# Patient Record
Sex: MALE | Race: BLACK OR AFRICAN AMERICAN | NOT HISPANIC OR LATINO | Employment: FULL TIME | URBAN - METROPOLITAN AREA
[De-identification: names, ages, dates, MRNs, and addresses within clinical notes are randomized per-mention and may not be internally consistent; named-entity substitution may affect disease eponyms.]

---

## 2017-10-22 ENCOUNTER — HOSPITAL ENCOUNTER (EMERGENCY)
Facility: HOSPITAL | Age: 32
Discharge: HOME | End: 2017-10-22
Attending: EMERGENCY MEDICINE
Payer: MEDICAID

## 2017-10-22 VITALS
HEIGHT: 68 IN | RESPIRATION RATE: 16 BRPM | SYSTOLIC BLOOD PRESSURE: 140 MMHG | DIASTOLIC BLOOD PRESSURE: 86 MMHG | OXYGEN SATURATION: 96 % | HEART RATE: 88 BPM | BODY MASS INDEX: 22.73 KG/M2 | TEMPERATURE: 98 F | WEIGHT: 150 LBS

## 2017-10-22 DIAGNOSIS — B35.4 TINEA CORPORIS: Primary | ICD-10-CM

## 2017-10-22 PROCEDURE — 99283 EMERGENCY DEPT VISIT LOW MDM: CPT

## 2017-10-22 RX ORDER — CLOTRIMAZOLE 1 %
CREAM (GRAM) TOPICAL
Qty: 28 G | Refills: 0 | OUTPATIENT
Start: 2017-10-22 | End: 2018-09-20

## 2017-10-22 NOTE — DISCHARGE INSTRUCTIONS
Please return to the Emergency Department for any new or worsening symptoms including: worsening rash or spreading of the rash, drainage, fever, chest pain, shortness of breath, loss of consciousness, dizziness, weakness, or any other concerns.     Please follow up with your Primary Care Provider or Dermatology within in the week. If you do not have one, you may contact the one listed on your discharge paperwork or you may also call the [facility] Appointment Desk at [phone] to schedule an appointment with one.     Please take all medication as prescribed.  Use the antifungal cream twice a day.  Please cover the lesion and extend approximately 1 inch around the edges.  Use this until the rash completely resolves and then 1 week additional week.  Benadryl as directed as needed for itching.

## 2017-10-22 NOTE — ED PROVIDER NOTES
"Encounter Date: 10/22/2017    SCRIBE #1 NOTE: I, Humphrey Sam , am scribing for, and in the presence of,  AYANA Pond . I have scribed the following portions of the note - Other sections scribed: HPI/ROS .       History     Chief Complaint   Patient presents with    Rash     " I think I may have gotten bit by something on my back at work. I noticed bumps on my back 2 days ago. +itching      CC: Rash     HPI: This 32 y.o. male with a hx of chronic back pain presents to the ED c/o three pruritic lesions to the upper back which initially began as one small, raised, and hard lesion 1 week ago which has since spread and increased in size. Pt notes he has been frequently scratching the area due to the intense itching. No prior attempted tx. No hx of similar rash. Pt states his son, who recently visited him, has a similar lesion to his face and is unsure if he was being treated for it.  Pt otherwise denies fever, pain, or any other associated symptoms.         The history is provided by the patient. No  was used.     Review of patient's allergies indicates:  No Known Allergies  Past Medical History:   Diagnosis Date    Back pain     x 10 years     History reviewed. No pertinent surgical history.  History reviewed. No pertinent family history.  Social History   Substance Use Topics    Smoking status: Former Smoker    Smokeless tobacco: Never Used    Alcohol use Yes     Review of Systems   Constitutional: Negative for chills, fatigue and fever.   HENT: Negative for congestion, ear pain, rhinorrhea and sore throat.    Respiratory: Negative for cough and shortness of breath.    Musculoskeletal: Negative for back pain, neck pain and neck stiffness.   Skin: Positive for rash (pruritic lesions to the upper back ).        (-) Drainage from the Wound   Psychiatric/Behavioral: Negative for confusion.       Physical Exam     Initial Vitals [10/22/17 1736]   BP Pulse Resp Temp SpO2   (!) 140/86 88 " 16 98.2 °F (36.8 °C) 96 %      MAP       104         Physical Exam    Nursing note and vitals reviewed.  Constitutional: He appears well-developed and well-nourished. He is not diaphoretic. He is cooperative.  Non-toxic appearance. No distress.   HENT:   Head: Normocephalic and atraumatic.   Right Ear: External ear normal.   Left Ear: External ear normal.   Mouth/Throat: Oropharynx is clear and moist.   Eyes: Conjunctivae and EOM are normal.   Neck: Normal range of motion.   Cardiovascular: Normal rate and regular rhythm.   Pulmonary/Chest: No respiratory distress.   Musculoskeletal: Normal range of motion.   Neurological: He is alert and oriented to person, place, and time. GCS eye subscore is 4. GCS verbal subscore is 5. GCS motor subscore is 6.   Skin: Skin is warm and dry. Capillary refill takes less than 2 seconds. Lesion (to back - see below) noted. No rash noted.   3 lesions noted to the mid back.  The lesions have some mild induration.  The central lesion does have an abrasion/skin avulsion present.  The patient is using a back stretcher, do believe this is likely the cause of change in appearance of lesions.   Psychiatric: He has a normal mood and affect. His behavior is normal. Judgment and thought content normal.             ED Course   Procedures  Labs Reviewed - No data to display                APC / Resident Notes:   This is an evaluation of a 32 y.o. male that presents to the Emergency Department for rash to the back. The patient's significant other reports that it started off as a small central annular lesion and progressed outwardly in a [place] and then spread to the two separate lesions.  The patient is a non-toxic, afebrile, and well appearing male. On physical exam, there is a annular and raised border rash with central clearing mild induration.  The central lesion has a skin tear/avulsion near the lower portion.  The patient was using a back scratcher for itching.  I do believe this is related  and the cause of the skin avulsion and induration.  There is no surrounding erythema suggestive underlying abscess. There are no oral mucosa lesions, lesions in the webspace's of the fingers or toes, lesions on the palms or soles, vesicular lesions, desquamation, or sloughing of the skin. No herald patch or satellite lesions. It does appear fungal. Vital Signs are Reassuring.     Given the above findings, my overall impression is Tinea/ringworm. Given the above findings, I do not think the patients rash is a result of Hand, Foot, and Mouth, Scabies, Shingles, Chicken Pox, meningococcemia, TENs, or SJS.     DC Meds: Clotrimazole. Additional recommendations: Antifungal directions. The diagnosis, treatment plan, instructions for follow-up and reevaluation with PCP/Derm as well as ED return precautions have been discussed and an understanding has been verbalized. All questions or concerns from the patient have been addressed. This case was discussed with Dr. Waggoner who is in agreement with my assessment and plan. .         Scribe Attestation:   Scribe #1: I performed the above scribed service and the documentation accurately describes the services I performed. I attest to the accuracy of the note.    Attending Attestation:           Physician Attestation for Scribe:  Physician Attestation Statement for Scribe #1: I, AYANA Pond , reviewed documentation, as scribed by Humphrey Sam  in my presence, and it is both accurate and complete.                 ED Course      Clinical Impression:   The encounter diagnosis was Tinea corporis.    Disposition:   Disposition: Discharged  Condition: Stable                        AYANA Pond  10/22/17 0681

## 2018-09-20 ENCOUNTER — HOSPITAL ENCOUNTER (EMERGENCY)
Facility: HOSPITAL | Age: 33
Discharge: HOME | End: 2018-09-20
Attending: EMERGENCY MEDICINE

## 2018-09-20 VITALS
HEART RATE: 80 BPM | SYSTOLIC BLOOD PRESSURE: 175 MMHG | OXYGEN SATURATION: 97 % | BODY MASS INDEX: 24.25 KG/M2 | HEIGHT: 68 IN | RESPIRATION RATE: 19 BRPM | DIASTOLIC BLOOD PRESSURE: 108 MMHG | WEIGHT: 160 LBS | TEMPERATURE: 101 F

## 2018-09-20 DIAGNOSIS — R50.9 FEBRILE ILLNESS: Primary | ICD-10-CM

## 2018-09-20 LAB
BACTERIA #/AREA URNS HPF: NORMAL /HPF
BASOPHILS # BLD AUTO: 0.02 K/UL
BASOPHILS NFR BLD: 0.2 %
BILIRUB UR QL STRIP: NEGATIVE
CLARITY UR: CLEAR
COLOR UR: YELLOW
DIFFERENTIAL METHOD: ABNORMAL
EOSINOPHIL # BLD AUTO: 0 K/UL
EOSINOPHIL NFR BLD: 0 %
ERYTHROCYTE [DISTWIDTH] IN BLOOD BY AUTOMATED COUNT: 12.5 %
GLUCOSE UR QL STRIP: NEGATIVE
HCT VFR BLD AUTO: 42.3 %
HGB BLD-MCNC: 14.8 G/DL
HGB UR QL STRIP: NEGATIVE
HYALINE CASTS #/AREA URNS LPF: 0 /LPF
KETONES UR QL STRIP: ABNORMAL
LEUKOCYTE ESTERASE UR QL STRIP: NEGATIVE
LYMPHOCYTES # BLD AUTO: 0.9 K/UL
LYMPHOCYTES NFR BLD: 10.8 %
MCH RBC QN AUTO: 31.1 PG
MCHC RBC AUTO-ENTMCNC: 35 G/DL
MCV RBC AUTO: 89 FL
MICROSCOPIC COMMENT: NORMAL
MONOCYTES # BLD AUTO: 1.1 K/UL
MONOCYTES NFR BLD: 12.7 %
NEUTROPHILS # BLD AUTO: 6.5 K/UL
NEUTROPHILS NFR BLD: 76.3 %
NITRITE UR QL STRIP: NEGATIVE
PH UR STRIP: 6 [PH] (ref 5–8)
PLATELET # BLD AUTO: 193 K/UL
PMV BLD AUTO: 8.7 FL
PROT UR QL STRIP: ABNORMAL
RBC # BLD AUTO: 4.76 M/UL
RBC #/AREA URNS HPF: 3 /HPF (ref 0–4)
SP GR UR STRIP: 1.02 (ref 1–1.03)
URN SPEC COLLECT METH UR: ABNORMAL
UROBILINOGEN UR STRIP-ACNC: ABNORMAL EU/DL
WBC # BLD AUTO: 8.49 K/UL
WBC #/AREA URNS HPF: 3 /HPF (ref 0–5)

## 2018-09-20 PROCEDURE — 99283 EMERGENCY DEPT VISIT LOW MDM: CPT

## 2018-09-20 PROCEDURE — 81000 URINALYSIS NONAUTO W/SCOPE: CPT

## 2018-09-20 PROCEDURE — 85025 COMPLETE CBC W/AUTO DIFF WBC: CPT

## 2018-09-21 NOTE — ED TRIAGE NOTES
Here for eval of low back pain onset last night while at rest, denies trauma, denies hematuria or dysuria, also c/o pain near both ears onset last night, swelling noted near lymph nodes

## 2018-09-21 NOTE — ED PROVIDER NOTES
Encounter Date: 9/20/2018  This is a SORT/MSE of a 33 y.o. male presenting to the ED with c/o fever, painful swelling to neck, and low back pain that began last night. Pertinent exam findings remarkable for bilateral neck swelling. He is febrile T103. Febrile Care will be transferred to an alternate provider when patient is roomed for a full evaluation and final disposition. Patient is aware that he/she is awaiting a room in the emergency department, where another provider will review results, evaluate and treat as needed. MAC Aragon DNP     History     Chief Complaint   Patient presents with    Fever     Pt has fever  with facial swelling  Temp 103.1    Back Pain     Lower back pain     Chief complaint:  Facial swelling lower back pain    History of present illness:  Patient is a 33-year-old male who reports fever and lower back pain x1 day with a T-max of 103.1° F. He also reports a 1 day history of bilateral facial swelling anterior to the ears.  He reports the pain is constant.  He has taken no medications today for this problem.  Denies congestion, runny nose, cough, wheezing, shortness of breath, abdominal pain, urinary frequency urgency hematuria or dysuria.  Reports fatigue.  Patient states he is unsure of his vaccination status or childhood vaccinations.      The history is provided by the patient. No  was used.     Review of patient's allergies indicates:  No Known Allergies  Past Medical History:   Diagnosis Date    Back pain     x 10 years     History reviewed. No pertinent surgical history.  History reviewed. No pertinent family history.  Social History     Tobacco Use    Smoking status: Former Smoker    Smokeless tobacco: Never Used   Substance Use Topics    Alcohol use: Yes    Drug use: No     Review of Systems   Constitutional: Positive for fatigue and fever. Negative for appetite change, chills and diaphoresis.   HENT: Negative for congestion, ear discharge, ear pain,  postnasal drip, rhinorrhea, sinus pressure, sneezing, sore throat and voice change.         Facial swelling   Eyes: Negative for discharge, itching and visual disturbance.   Respiratory: Negative for cough, shortness of breath and wheezing.    Cardiovascular: Negative for chest pain, palpitations and leg swelling.   Gastrointestinal: Negative for abdominal pain, nausea and vomiting.   Endocrine: Negative for polydipsia, polyphagia and polyuria.   Genitourinary: Negative for difficulty urinating, discharge, dysuria, frequency, hematuria, penile pain, penile swelling and urgency.   Musculoskeletal: Negative for arthralgias and myalgias.   Skin: Negative for rash and wound.   Neurological: Negative for dizziness, seizures, syncope and weakness.   Hematological: Negative for adenopathy. Does not bruise/bleed easily.   Psychiatric/Behavioral: Negative for agitation and self-injury. The patient is not nervous/anxious.        Physical Exam     Initial Vitals   BP Pulse Resp Temp SpO2   -- -- -- -- --      MAP       --         Physical Exam    Nursing note and vitals reviewed.  Constitutional: He appears well-developed and well-nourished. He is not diaphoretic. No distress. He is not intubated.   HENT:   Head: Normocephalic and atraumatic.   Right Ear: External ear normal.   Left Ear: External ear normal.   Nose: Nose normal.   Eyes: Pupils are equal, round, and reactive to light. Right eye exhibits no discharge. Left eye exhibits no discharge. No scleral icterus.   Neck: Normal range of motion.   Cardiovascular: Regular rhythm, S1 normal, S2 normal and normal heart sounds. Exam reveals no gallop.    No murmur heard.  Pulmonary/Chest: Effort normal and breath sounds normal. No accessory muscle usage. No apnea, no tachypnea and no bradypnea. He is not intubated. No respiratory distress. He has no decreased breath sounds. He has no wheezes. He has no rhonchi. He has no rales.   Abdominal: He exhibits no distension.    Musculoskeletal: Normal range of motion.   Neurological: He is alert and oriented to person, place, and time.   Skin: Skin is dry. Capillary refill takes less than 2 seconds.         ED Course   Procedures  Labs Reviewed   URINALYSIS, REFLEX TO URINE CULTURE          Imaging Results    None          Medical Decision Making:   Initial Assessment:   33-year-old male with facial swelling, fever  Differential Diagnosis:   UTI, pneumonia, viral illness, mumps  ED Management:  We ordered CBC, urinalysis to evaluate for possible causes of fever.  Patient was nontoxic though febrile.  There was lymphadenopathy about the head and neck and swollen areas at the angle of the mandible.  Patient had no trismus, no redness, no tenderness. Physical examination was essentially normal with exception of the swelling to the preauricular area. Dr. Holland independently examined the patient and agreed that there is no emergent condition requiring treatment.  Most likely dx is viral illness, possibly mumps.  For this reason pt is d/c'ed to home in good condition to f/u promptly tomorrow with pcp referral provided.  He should take ibuprofen 400-600mg po q6h and return for worsening or change in condition.  Other:   I have discussed this case with another health care provider.       <> Summary of the Discussion: Care of the patient discussed with Dr. Holland who agreed with the assessment and plan.                    ED Course as of Sep 20 2129   Thu Sep 20, 2018   1938 BP: (!) 173/94 [VC]   1938 Temp: (!) 103.1 °F (39.5 °C) [VC]   1938 Pulse: 94 [VC]   1938 Resp: 18 [VC]   1938 SpO2: 100 % [VC]   2006 BP: (!) 167/103 [VC]   2008 Temp: (!) 103.1 °F (39.5 °C) [VC]   2008 Temp src: Oral [VC]   2008 Pulse: 94 [VC]   2008 Resp: 18 [VC]   2008 SpO2: 99 % [VC]   2020 UA is negative for infection, no nitrites, leukocytes, blood, or protein present.    [VC]   2047 CBC: leukocyte count was normal, the H&H was normal. The platelet count was normal.       [VC]      ED Course User Index  [VC] Venkata Clark DNP     Clinical Impression:   The encounter diagnosis was Febrile illness.      Disposition:   Disposition: Discharged  Condition: Stable                        Venkata Clark DNP  09/20/18 8908

## 2018-09-21 NOTE — DISCHARGE INSTRUCTIONS
Alternate Tylenol and advil every 3 hours for fever/body aches. Return to the Emergency department for any worsening or failure to improve, otherwise follow up with your primary care provider.

## 2019-10-30 ENCOUNTER — HOSPITAL ENCOUNTER (EMERGENCY)
Facility: HOSPITAL | Age: 34
Discharge: HOME | End: 2019-10-30
Attending: EMERGENCY MEDICINE

## 2019-10-30 VITALS
SYSTOLIC BLOOD PRESSURE: 183 MMHG | BODY MASS INDEX: 24.44 KG/M2 | RESPIRATION RATE: 16 BRPM | TEMPERATURE: 99 F | OXYGEN SATURATION: 99 % | HEIGHT: 69 IN | WEIGHT: 165 LBS | HEART RATE: 65 BPM | DIASTOLIC BLOOD PRESSURE: 113 MMHG

## 2019-10-30 DIAGNOSIS — L02.415 ABSCESS OF KNEE, RIGHT: Primary | ICD-10-CM

## 2019-10-30 PROCEDURE — 10060 I&D ABSCESS SIMPLE/SINGLE: CPT

## 2019-10-30 PROCEDURE — 25000003 PHARM REV CODE 250: Performed by: PHYSICIAN ASSISTANT

## 2019-10-30 PROCEDURE — 99283 EMERGENCY DEPT VISIT LOW MDM: CPT | Mod: 25

## 2019-10-30 RX ORDER — CEPHALEXIN 500 MG/1
500 CAPSULE ORAL
Status: COMPLETED | OUTPATIENT
Start: 2019-10-30 | End: 2019-10-30

## 2019-10-30 RX ORDER — CEPHALEXIN 500 MG/1
500 CAPSULE ORAL 4 TIMES DAILY
Qty: 39 CAPSULE | Refills: 0 | Status: SHIPPED | OUTPATIENT
Start: 2019-10-30 | End: 2019-11-09

## 2019-10-30 RX ORDER — LIDOCAINE HYDROCHLORIDE 10 MG/ML
10 INJECTION INFILTRATION; PERINEURAL
Status: COMPLETED | OUTPATIENT
Start: 2019-10-30 | End: 2019-10-30

## 2019-10-30 RX ADMIN — LIDOCAINE HYDROCHLORIDE 10 ML: 10 INJECTION, SOLUTION INFILTRATION; PERINEURAL at 10:10

## 2019-10-30 RX ADMIN — CEPHALEXIN 500 MG: 500 CAPSULE ORAL at 11:10

## 2019-10-31 NOTE — ED TRIAGE NOTES
Patient came to the ED with complaint of a possible insect bite to the right knee that has cause a swelling and redness to the top of the knee.

## 2019-10-31 NOTE — ED PROVIDER NOTES
Encounter Date: 10/30/2019    SCRIBE #1 NOTE: I, Kori Kahn, am scribing for, and in the presence of,  Anastasiia Ramirez PA-C. I have scribed the following portions of the note - Other sections scribed: HPI.       History   No chief complaint on file.    CC: Insect Bite    HPI:  This is a 34 y.o. male, with no pertinent PMHx, who presents to the Emergency Department with a cc of an insect bite to the right knee pain. The patient believes he was bitten while at work; he drives trucks for a living. He is unsure what bit him. The patient reports associated itching, swelling, redness, and pain to the area. He denies any fever/chills or drainage from the site. 1 Penicillin tablet was taken from a previous prescription, unrelated to the complaint. There were no worsening factors reported. Patient reports no prior history of similar symptoms. NKDA.        The history is provided by the patient.     Review of patient's allergies indicates:  No Known Allergies  Past Medical History:   Diagnosis Date    Back pain     x 10 years     History reviewed. No pertinent surgical history.  History reviewed. No pertinent family history.  Social History     Tobacco Use    Smoking status: Current Some Day Smoker    Smokeless tobacco: Never Used    Tobacco comment: black and milds   Substance Use Topics    Alcohol use: Yes    Drug use: No     Review of Systems   Constitutional: Negative for chills and fever.   Musculoskeletal: Negative for arthralgias, joint swelling and myalgias.   Skin: Positive for color change. Negative for wound.   Allergic/Immunologic: Negative for immunocompromised state.   Neurological: Negative for weakness.   Hematological: Does not bruise/bleed easily.   Psychiatric/Behavioral: Negative for confusion.       Physical Exam     Initial Vitals [10/30/19 2234]   BP Pulse Resp Temp SpO2   (!) 167/102 81 16 98.4 °F (36.9 °C) 95 %      MAP       --         Physical Exam    Vitals reviewed.  Constitutional: He  appears well-developed and well-nourished. He is not diaphoretic. No distress.   HENT:   Head: Normocephalic and atraumatic.   Mouth/Throat: Oropharynx is clear and moist.   Eyes: Conjunctivae and EOM are normal.   Neck: Neck supple.   Cardiovascular: Normal rate, regular rhythm, normal heart sounds and intact distal pulses.   Pulmonary/Chest: Breath sounds normal.   Neurological: He is alert and oriented to person, place, and time.   Skin: Skin is warm and dry. Abscess (0.5 cm abscess to the right upper knee with mild erythema surrounding the site) noted.         ED Course   I & D - Incision and Drainage  Date/Time: 10/30/2019 11:54 PM  Performed by: Anastasiia Ramirez PA-C  Authorized by: Bob Uribe MD   Type: abscess  Body area: lower extremity  Location details: right leg  Anesthesia: local infiltration    Anesthesia:  Local anesthesia used: yes  Local Anesthetic: lidocaine 1% without epinephrine  Scalpel size: 11  Incision type: single straight  Complexity: simple  Drainage: serosanguinous and  purulent  Drainage amount: scant  Wound treatment: incision and  drainage  Packing material: none  Patient tolerance: Patient tolerated the procedure well with no immediate complications        Labs Reviewed - No data to display       Imaging Results    None                APC / Resident Notes:   Patient was seen in the ER the patient upon arrival.  He is afebrile, no acute distress. Physical examination reveals a small 0.5 cm abscess to the right knee.  There is associated erythema.  Range of motion of the knees fully intact.  Not consistent with septic arthritis.    1% lidocaine used than sick agent.  Small incision was made.  Scant amount of purulent discharge was expressed from the site.  Dressing applied.  Patient tolerated the procedure well. Will prescribed home on Keflex for the associated mild cellulitis.  Advised to follow up with family doctor this week. Keep the wound dry and clean. Watch for signs of  infection including redness, swelling, drainage, fever or chills.        Scribe Attestation:   Scribe #1: I performed the above scribed service and the documentation accurately describes the services I performed. I attest to the accuracy of the note.     I, Anastasiia Denny personally performed the services described in this documentation. All medical record entries made by the scribe were at my direction and in my presence.  I have reviewed the chart and agree that the record reflects my personal performance and is accurate and complete. Anastasiia Denny PA-C  11:42 PM 10/30/2019               Clinical Impression:     1. Abscess of knee, right            Disposition:   Disposition: Discharged  Condition: Stable                        Anastasiia Denny PA-C  10/30/19 1662       Anastasiia Denny PA-C  10/30/19 3526

## 2019-10-31 NOTE — DISCHARGE INSTRUCTIONS
Please take medication as prescribed.  Finish full course of antibiotics.  Alternate between Tylenol and Motrin for fever and pain. Follow up with family doctor this week. Keep the wound dry and clean. Watch for signs of infection including redness, swelling, drainage, fever or chills. Return to the ED with worsening or concerning symptoms.

## 2021-02-05 ENCOUNTER — HOSPITAL ENCOUNTER (EMERGENCY)
Facility: HOSPITAL | Age: 36
Discharge: HOME | End: 2021-02-05
Attending: EMERGENCY MEDICINE
Payer: MEDICAID

## 2021-02-05 VITALS
TEMPERATURE: 99 F | DIASTOLIC BLOOD PRESSURE: 117 MMHG | RESPIRATION RATE: 18 BRPM | HEIGHT: 68 IN | SYSTOLIC BLOOD PRESSURE: 181 MMHG | WEIGHT: 160 LBS | BODY MASS INDEX: 24.25 KG/M2 | OXYGEN SATURATION: 98 % | HEART RATE: 65 BPM

## 2021-02-05 DIAGNOSIS — J06.9 VIRAL URI WITH COUGH: Primary | ICD-10-CM

## 2021-02-05 DIAGNOSIS — I10 HYPERTENSION, UNSPECIFIED TYPE: ICD-10-CM

## 2021-02-05 LAB
CTP QC/QA: YES
SARS-COV-2 RDRP RESP QL NAA+PROBE: NEGATIVE

## 2021-02-05 PROCEDURE — U0002 COVID-19 LAB TEST NON-CDC: HCPCS | Performed by: PHYSICIAN ASSISTANT

## 2021-02-05 PROCEDURE — 99284 EMERGENCY DEPT VISIT MOD MDM: CPT

## 2021-02-05 RX ORDER — CETIRIZINE HYDROCHLORIDE 10 MG/1
10 TABLET ORAL DAILY
Qty: 30 TABLET | Refills: 0 | Status: SHIPPED | OUTPATIENT
Start: 2021-02-05 | End: 2022-02-05

## 2021-02-05 RX ORDER — BENZONATATE 100 MG/1
100 CAPSULE ORAL 3 TIMES DAILY PRN
Qty: 20 CAPSULE | Refills: 0 | Status: SHIPPED | OUTPATIENT
Start: 2021-02-05 | End: 2021-02-15

## 2021-04-12 ENCOUNTER — PATIENT MESSAGE (OUTPATIENT)
Dept: RESEARCH | Facility: HOSPITAL | Age: 36
End: 2021-04-12

## 2021-05-14 ENCOUNTER — HOSPITAL ENCOUNTER (EMERGENCY)
Facility: HOSPITAL | Age: 36
Discharge: HOME | End: 2021-05-14
Attending: EMERGENCY MEDICINE
Payer: MEDICAID

## 2021-05-14 VITALS
OXYGEN SATURATION: 99 % | TEMPERATURE: 98 F | RESPIRATION RATE: 18 BRPM | BODY MASS INDEX: 24.25 KG/M2 | SYSTOLIC BLOOD PRESSURE: 156 MMHG | WEIGHT: 160 LBS | HEART RATE: 59 BPM | HEIGHT: 68 IN | DIASTOLIC BLOOD PRESSURE: 96 MMHG

## 2021-05-14 DIAGNOSIS — M79.641 RIGHT HAND PAIN: Primary | ICD-10-CM

## 2021-05-14 PROCEDURE — 99283 EMERGENCY DEPT VISIT LOW MDM: CPT | Mod: 25

## 2021-05-14 RX ORDER — SULINDAC 150 MG/1
150 TABLET ORAL 2 TIMES DAILY
Qty: 10 TABLET | Refills: 0 | Status: SHIPPED | OUTPATIENT
Start: 2021-05-14 | End: 2021-05-19

## 2021-05-30 ENCOUNTER — HOSPITAL ENCOUNTER (EMERGENCY)
Facility: HOSPITAL | Age: 36
Discharge: HOME | End: 2021-05-30
Attending: EMERGENCY MEDICINE
Payer: MEDICAID

## 2021-05-30 VITALS
TEMPERATURE: 99 F | RESPIRATION RATE: 18 BRPM | HEART RATE: 89 BPM | BODY MASS INDEX: 24.25 KG/M2 | DIASTOLIC BLOOD PRESSURE: 68 MMHG | SYSTOLIC BLOOD PRESSURE: 174 MMHG | OXYGEN SATURATION: 100 % | WEIGHT: 160 LBS | HEIGHT: 68 IN

## 2021-05-30 DIAGNOSIS — S62.322A CLOSED DISPLACED FRACTURE OF SHAFT OF THIRD METACARPAL BONE OF RIGHT HAND, INITIAL ENCOUNTER: Primary | ICD-10-CM

## 2021-05-30 PROCEDURE — 99284 EMERGENCY DEPT VISIT MOD MDM: CPT | Mod: 25

## 2021-05-30 PROCEDURE — 29125 APPL SHORT ARM SPLINT STATIC: CPT | Mod: RT

## 2021-05-30 PROCEDURE — 25000003 PHARM REV CODE 250: Performed by: EMERGENCY MEDICINE

## 2021-05-30 RX ORDER — OXYCODONE AND ACETAMINOPHEN 5; 325 MG/1; MG/1
1 TABLET ORAL EVERY 6 HOURS PRN
Qty: 12 TABLET | Refills: 0 | Status: SHIPPED | OUTPATIENT
Start: 2021-05-30

## 2021-05-30 RX ORDER — MELOXICAM 7.5 MG/1
7.5 TABLET ORAL DAILY
Qty: 20 TABLET | Refills: 0 | Status: SHIPPED | OUTPATIENT
Start: 2021-05-30

## 2021-05-30 RX ORDER — OXYCODONE AND ACETAMINOPHEN 5; 325 MG/1; MG/1
1 TABLET ORAL
Status: COMPLETED | OUTPATIENT
Start: 2021-05-30 | End: 2021-05-30

## 2021-05-30 RX ORDER — AMLODIPINE BESYLATE 10 MG/1
10 TABLET ORAL DAILY
COMMUNITY
End: 2022-11-08

## 2021-05-30 RX ADMIN — OXYCODONE HYDROCHLORIDE AND ACETAMINOPHEN 1 TABLET: 5; 325 TABLET ORAL at 04:05

## 2021-06-19 ENCOUNTER — HOSPITAL ENCOUNTER (EMERGENCY)
Facility: HOSPITAL | Age: 36
Discharge: HOME | End: 2021-06-19
Attending: EMERGENCY MEDICINE
Payer: MEDICAID

## 2021-06-19 VITALS
RESPIRATION RATE: 18 BRPM | WEIGHT: 160 LBS | BODY MASS INDEX: 24.25 KG/M2 | TEMPERATURE: 98 F | OXYGEN SATURATION: 99 % | HEIGHT: 68 IN | DIASTOLIC BLOOD PRESSURE: 91 MMHG | SYSTOLIC BLOOD PRESSURE: 166 MMHG | HEART RATE: 60 BPM

## 2021-06-19 DIAGNOSIS — R81 GLUCOSURIA: ICD-10-CM

## 2021-06-19 DIAGNOSIS — R10.31 BILATERAL LOWER ABDOMINAL CRAMPING: Primary | ICD-10-CM

## 2021-06-19 DIAGNOSIS — R10.32 BILATERAL LOWER ABDOMINAL CRAMPING: Primary | ICD-10-CM

## 2021-06-19 DIAGNOSIS — R11.2 NON-INTRACTABLE VOMITING WITH NAUSEA, UNSPECIFIED VOMITING TYPE: ICD-10-CM

## 2021-06-19 LAB
BILIRUB UR QL STRIP: NEGATIVE
CLARITY UR: CLEAR
COLOR UR: YELLOW
GLUCOSE UR QL STRIP: ABNORMAL
HGB UR QL STRIP: NEGATIVE
KETONES UR QL STRIP: NEGATIVE
LEUKOCYTE ESTERASE UR QL STRIP: NEGATIVE
NITRITE UR QL STRIP: NEGATIVE
PH UR STRIP: >8 [PH] (ref 5–8)
POCT GLUCOSE: 147 MG/DL (ref 70–110)
PROT UR QL STRIP: NEGATIVE
SP GR UR STRIP: 1.02 (ref 1–1.03)
URN SPEC COLLECT METH UR: ABNORMAL
UROBILINOGEN UR STRIP-ACNC: NEGATIVE EU/DL

## 2021-06-19 PROCEDURE — 25000003 PHARM REV CODE 250: Performed by: PHYSICIAN ASSISTANT

## 2021-06-19 PROCEDURE — 81003 URINALYSIS AUTO W/O SCOPE: CPT | Performed by: PHYSICIAN ASSISTANT

## 2021-06-19 PROCEDURE — 99284 EMERGENCY DEPT VISIT MOD MDM: CPT | Mod: 25

## 2021-06-19 PROCEDURE — 82962 GLUCOSE BLOOD TEST: CPT

## 2021-06-19 RX ORDER — ONDANSETRON 8 MG/1
8 TABLET, ORALLY DISINTEGRATING ORAL
Status: COMPLETED | OUTPATIENT
Start: 2021-06-19 | End: 2021-06-19

## 2021-06-19 RX ORDER — PROMETHAZINE HYDROCHLORIDE 25 MG/1
25 TABLET ORAL EVERY 6 HOURS PRN
Qty: 15 TABLET | Refills: 0 | Status: SHIPPED | OUTPATIENT
Start: 2021-06-19

## 2021-06-19 RX ORDER — DICYCLOMINE HYDROCHLORIDE 10 MG/1
20 CAPSULE ORAL
Status: COMPLETED | OUTPATIENT
Start: 2021-06-19 | End: 2021-06-19

## 2021-06-19 RX ORDER — DICYCLOMINE HYDROCHLORIDE 20 MG/1
20 TABLET ORAL 4 TIMES DAILY
Qty: 12 TABLET | Refills: 0 | Status: SHIPPED | OUTPATIENT
Start: 2021-06-19 | End: 2021-06-22

## 2021-06-19 RX ORDER — ONDANSETRON 4 MG/1
8 TABLET, FILM COATED ORAL EVERY 6 HOURS PRN
Qty: 30 TABLET | Refills: 0 | Status: SHIPPED | OUTPATIENT
Start: 2021-06-19 | End: 2021-07-19

## 2021-06-19 RX ADMIN — ONDANSETRON 8 MG: 8 TABLET, ORALLY DISINTEGRATING ORAL at 11:06

## 2021-06-19 RX ADMIN — DICYCLOMINE HYDROCHLORIDE 20 MG: 10 CAPSULE ORAL at 11:06

## 2021-07-01 ENCOUNTER — PATIENT MESSAGE (OUTPATIENT)
Dept: ADMINISTRATIVE | Facility: OTHER | Age: 36
End: 2021-07-01

## 2022-11-08 ENCOUNTER — HOSPITAL ENCOUNTER (EMERGENCY)
Facility: HOSPITAL | Age: 37
Discharge: HOME | End: 2022-11-08
Attending: EMERGENCY MEDICINE
Payer: MEDICAID

## 2022-11-08 VITALS
DIASTOLIC BLOOD PRESSURE: 87 MMHG | HEART RATE: 59 BPM | BODY MASS INDEX: 25.7 KG/M2 | SYSTOLIC BLOOD PRESSURE: 135 MMHG | TEMPERATURE: 98 F | OXYGEN SATURATION: 97 % | RESPIRATION RATE: 16 BRPM | WEIGHT: 169 LBS

## 2022-11-08 DIAGNOSIS — K52.9 GASTROENTERITIS: ICD-10-CM

## 2022-11-08 DIAGNOSIS — R10.9 ABDOMINAL PAIN, UNSPECIFIED ABDOMINAL LOCATION: Primary | ICD-10-CM

## 2022-11-08 DIAGNOSIS — I16.0 HYPERTENSIVE URGENCY: ICD-10-CM

## 2022-11-08 DIAGNOSIS — R07.9 CHEST PAIN: ICD-10-CM

## 2022-11-08 LAB
ALBUMIN SERPL-MCNC: 4.3 G/DL (ref 3.3–5.5)
ALBUMIN SERPL-MCNC: 4.4 G/DL (ref 3.3–5.5)
ALP SERPL-CCNC: 102 U/L (ref 42–141)
ALP SERPL-CCNC: 96 U/L (ref 42–141)
BILIRUB SERPL-MCNC: 0.5 MG/DL (ref 0.2–1.6)
BILIRUB SERPL-MCNC: 0.5 MG/DL (ref 0.2–1.6)
BILIRUBIN, POC UA: NEGATIVE
BLOOD, POC UA: NEGATIVE
BUN SERPL-MCNC: 12 MG/DL (ref 7–22)
CALCIUM SERPL-MCNC: 9.9 MG/DL (ref 8–10.3)
CHLORIDE SERPL-SCNC: 102 MMOL/L (ref 98–108)
CLARITY, POC UA: CLEAR
COLOR, POC UA: YELLOW
CREAT SERPL-MCNC: 1.2 MG/DL (ref 0.6–1.2)
GLUCOSE SERPL-MCNC: 91 MG/DL (ref 73–118)
GLUCOSE, POC UA: NEGATIVE
HCT, POC: NORMAL
HGB, POC: NORMAL (ref 14–18)
KETONES, POC UA: NEGATIVE
LEUKOCYTE EST, POC UA: NEGATIVE
MCH, POC: NORMAL
MCHC, POC: NORMAL
MCV, POC: NORMAL
MPV, POC: NORMAL
NITRITE, POC UA: NEGATIVE
PH UR STRIP: 6.5 [PH]
POC ALT (SGPT): 20 U/L (ref 10–47)
POC ALT (SGPT): 25 U/L (ref 10–47)
POC AMYLASE: 48 U/L (ref 14–97)
POC AST (SGOT): 32 U/L (ref 11–38)
POC AST (SGOT): 33 U/L (ref 11–38)
POC B-TYPE NATRIURETIC PEPTIDE: 10.3 PG/ML (ref 0–100)
POC CARDIAC TROPONIN I: 0 NG/ML
POC CARDIAC TROPONIN I: 0.01 NG/ML
POC GGT: 20 U/L (ref 5–65)
POC PLATELET COUNT: NORMAL
POC PTINR: 1.1 (ref 0.9–1.2)
POC PTWBT: 12.9 SEC (ref 9.7–14.3)
POC TCO2: 29 MMOL/L (ref 18–33)
POTASSIUM BLD-SCNC: 3.6 MMOL/L (ref 3.6–5.1)
PROTEIN, POC UA: NEGATIVE
PROTEIN, POC: 8 G/DL (ref 6.4–8.1)
PROTEIN, POC: 8.1 G/DL (ref 6.4–8.1)
RBC, POC: NORMAL
RDW, POC: NORMAL
SAMPLE: NORMAL
SODIUM BLD-SCNC: 150 MMOL/L (ref 128–145)
SPECIFIC GRAVITY, POC UA: 1.02
UROBILINOGEN, POC UA: 0.2 E.U./DL
WBC, POC: NORMAL

## 2022-11-08 PROCEDURE — 83880 ASSAY OF NATRIURETIC PEPTIDE: CPT | Mod: ER

## 2022-11-08 PROCEDURE — 93005 ELECTROCARDIOGRAM TRACING: CPT | Mod: ER

## 2022-11-08 PROCEDURE — 99285 EMERGENCY DEPT VISIT HI MDM: CPT | Mod: 25,ER

## 2022-11-08 PROCEDURE — 85610 PROTHROMBIN TIME: CPT | Mod: ER

## 2022-11-08 PROCEDURE — 82150 ASSAY OF AMYLASE: CPT | Mod: ER

## 2022-11-08 PROCEDURE — 96360 HYDRATION IV INFUSION INIT: CPT | Mod: ER

## 2022-11-08 PROCEDURE — 25500020 PHARM REV CODE 255: Mod: ER | Performed by: EMERGENCY MEDICINE

## 2022-11-08 PROCEDURE — 84484 ASSAY OF TROPONIN QUANT: CPT | Mod: ER

## 2022-11-08 PROCEDURE — 81003 URINALYSIS AUTO W/O SCOPE: CPT | Mod: ER

## 2022-11-08 PROCEDURE — 25000003 PHARM REV CODE 250: Mod: ER | Performed by: EMERGENCY MEDICINE

## 2022-11-08 PROCEDURE — 93010 ELECTROCARDIOGRAM REPORT: CPT | Mod: 76,59,, | Performed by: INTERNAL MEDICINE

## 2022-11-08 PROCEDURE — 25000003 PHARM REV CODE 250: Mod: ER | Performed by: PHYSICIAN ASSISTANT

## 2022-11-08 PROCEDURE — 85025 COMPLETE CBC W/AUTO DIFF WBC: CPT | Mod: ER

## 2022-11-08 PROCEDURE — 93010 EKG 12-LEAD: ICD-10-PCS | Mod: ,,, | Performed by: INTERNAL MEDICINE

## 2022-11-08 PROCEDURE — 80053 COMPREHEN METABOLIC PANEL: CPT | Mod: ER

## 2022-11-08 PROCEDURE — 96361 HYDRATE IV INFUSION ADD-ON: CPT | Mod: ER

## 2022-11-08 RX ORDER — AMLODIPINE BESYLATE 5 MG/1
10 TABLET ORAL
Status: COMPLETED | OUTPATIENT
Start: 2022-11-08 | End: 2022-11-08

## 2022-11-08 RX ORDER — ONDANSETRON 8 MG/1
8 TABLET, ORALLY DISINTEGRATING ORAL EVERY 6 HOURS PRN
Qty: 20 TABLET | Refills: 0 | Status: SHIPPED | OUTPATIENT
Start: 2022-11-08 | End: 2022-11-10

## 2022-11-08 RX ORDER — IBUPROFEN 600 MG/1
600 TABLET ORAL EVERY 6 HOURS PRN
Qty: 20 TABLET | Refills: 0 | Status: SHIPPED | OUTPATIENT
Start: 2022-11-08

## 2022-11-08 RX ORDER — ACETAMINOPHEN 500 MG
1000 TABLET ORAL EVERY 6 HOURS PRN
Qty: 30 TABLET | Refills: 0 | Status: SHIPPED | OUTPATIENT
Start: 2022-11-08

## 2022-11-08 RX ORDER — AMLODIPINE BESYLATE 10 MG/1
10 TABLET ORAL DAILY
Qty: 30 TABLET | Refills: 0 | Status: SHIPPED | OUTPATIENT
Start: 2022-11-08 | End: 2023-11-08

## 2022-11-08 RX ORDER — ASPIRIN 325 MG
325 TABLET ORAL
Status: COMPLETED | OUTPATIENT
Start: 2022-11-08 | End: 2022-11-08

## 2022-11-08 RX ORDER — ASPIRIN 325 MG
325 TABLET ORAL
Status: DISCONTINUED | OUTPATIENT
Start: 2022-11-08 | End: 2022-11-08 | Stop reason: HOSPADM

## 2022-11-08 RX ORDER — NAPROXEN SODIUM 220 MG/1
81 TABLET, FILM COATED ORAL DAILY
Qty: 30 TABLET | Refills: 0 | Status: SHIPPED | OUTPATIENT
Start: 2022-11-08 | End: 2023-11-08

## 2022-11-08 RX ORDER — FAMOTIDINE 20 MG/1
20 TABLET, FILM COATED ORAL 2 TIMES DAILY
Qty: 20 TABLET | Refills: 0 | Status: SHIPPED | OUTPATIENT
Start: 2022-11-08 | End: 2023-11-08

## 2022-11-08 RX ORDER — ONDANSETRON 4 MG/1
4 TABLET, ORALLY DISINTEGRATING ORAL
Status: COMPLETED | OUTPATIENT
Start: 2022-11-08 | End: 2022-11-08

## 2022-11-08 RX ORDER — DICYCLOMINE HYDROCHLORIDE 10 MG/1
20 CAPSULE ORAL ONCE
Status: COMPLETED | OUTPATIENT
Start: 2022-11-08 | End: 2022-11-08

## 2022-11-08 RX ORDER — FAMOTIDINE 20 MG/1
20 TABLET, FILM COATED ORAL
Status: COMPLETED | OUTPATIENT
Start: 2022-11-08 | End: 2022-11-08

## 2022-11-08 RX ADMIN — FAMOTIDINE 20 MG: 20 TABLET ORAL at 05:11

## 2022-11-08 RX ADMIN — IOHEXOL 100 ML: 350 INJECTION, SOLUTION INTRAVENOUS at 07:11

## 2022-11-08 RX ADMIN — AMLODIPINE BESYLATE 10 MG: 5 TABLET ORAL at 07:11

## 2022-11-08 RX ADMIN — ONDANSETRON 4 MG: 4 TABLET, ORALLY DISINTEGRATING ORAL at 05:11

## 2022-11-08 RX ADMIN — ASPIRIN 325 MG ORAL TABLET 325 MG: 325 PILL ORAL at 05:11

## 2022-11-08 RX ADMIN — NITROGLYCERIN 1 INCH: 20 OINTMENT TOPICAL at 07:11

## 2022-11-08 RX ADMIN — DICYCLOMINE HYDROCHLORIDE 20 MG: 10 CAPSULE ORAL at 05:11

## 2022-11-08 RX ADMIN — SODIUM CHLORIDE 1000 ML: 0.9 INJECTION, SOLUTION INTRAVENOUS at 05:11

## 2022-11-09 NOTE — ED NOTES
"Pt presents to ed with c/o abd discomfort. Pt reports abd pain/n/v began after eating "bad fruit". Nad noted. Resp even and unlabored. Patient presents with HTN. Pt states he has been out of his meds for the past 2 days. Patient placed on cardiac mon. Will continue to monitor at this time.   "

## 2022-11-09 NOTE — ED PROVIDER NOTES
"Encounter Date: 11/8/2022    SCRIBE #1 NOTE: I, Katya Valdes, am scribing for, and in the presence of,  Kirsten Samuel DO. I have scribed the following portions of the note - Other sections scribed: HPI, ROS, PE.     History     Chief Complaint   Patient presents with    Abdominal Pain     Pt states,"My stomach hurts, I ate some bad food today."     37 y.o. male with Hx of HLD and HTN, non-complaint with medication, who presents to the ED for chief complaint of cramping abdominal pain after eating bad fruit around 1:30 PM. Patient notes vomiting earlier today, decreased appetite, and chills. His nausea is currently resolved. No attempted treatment. Denies known sick contacts. Patient additionally reports stabbing chest pain that moved across his left chest while in the waiting room today. He takes Amlodipine but ran out 2 days ago and has not taken any doses since. Denies fever, cough, shortness of breath, dysuria, diarrhea, or hematochezia.     The history is provided by the patient. No  was used.   Review of patient's allergies indicates:  No Known Allergies  Past Medical History:   Diagnosis Date    Back pain     x 10 years    HLD (hyperlipidemia)     Hypertension      No past surgical history on file.  No family history on file.  Social History     Tobacco Use    Smoking status: Never    Smokeless tobacco: Never    Tobacco comments:     black and milds   Substance Use Topics    Alcohol use: Yes     Comment: occasionally    Drug use: No     Review of Systems   Constitutional:  Positive for appetite change and chills. Negative for fever.   HENT:  Negative for rhinorrhea and sore throat.    Respiratory:  Negative for cough and shortness of breath.    Cardiovascular:  Positive for chest pain. Negative for leg swelling.   Gastrointestinal:  Positive for abdominal pain and vomiting. Negative for blood in stool, diarrhea and nausea (resolved).   Genitourinary:  Negative for dysuria.   Skin:  Negative " for rash.   Neurological:  Negative for numbness.   All other systems reviewed and are negative.    Physical Exam     Initial Vitals [11/08/22 1436]   BP Pulse Resp Temp SpO2   (!) 189/132 92 16 98.8 °F (37.1 °C) 98 %      MAP       --         Patient gave consent to have physical exam performed.  Physical Exam    Nursing note and vitals reviewed.  Constitutional: He appears well-developed and well-nourished.   HENT:   Head: Normocephalic and atraumatic.   Right Ear: External ear normal.   Left Ear: External ear normal.   Nose: Nose normal.   Mouth/Throat: Oropharynx is clear and moist.   Eyes: Conjunctivae and EOM are normal. Pupils are equal, round, and reactive to light.   Neck: Neck supple. JVD (bilateral) present.   Normal range of motion.  Cardiovascular:  Normal rate, regular rhythm and normal heart sounds.     Exam reveals no gallop and no friction rub.       No murmur heard.  Pulmonary/Chest: Breath sounds normal. No respiratory distress. He has no wheezes. He has no rhonchi. He has no rales. He exhibits tenderness (anterior).   Abdominal: Abdomen is soft. Bowel sounds are normal. There is abdominal tenderness in the right lower quadrant and left lower quadrant.   No epigastric tenderness.  There is no rebound and no guarding.   Musculoskeletal:         General: No tenderness or edema. Normal range of motion.      Cervical back: Normal range of motion and neck supple.     Neurological: He is alert and oriented to person, place, and time. No cranial nerve deficit.   Skin: Skin is warm and dry. Capillary refill takes less than 2 seconds. No rash noted.   Psychiatric: He has a normal mood and affect. His behavior is normal.       ED Course   Procedures  Labs Reviewed   POCT CMP - Abnormal; Notable for the following components:       Result Value    POC Sodium 150 (*)     All other components within normal limits   TROPONIN ISTAT   TROPONIN ISTAT   POCT CBC   POCT URINALYSIS W/O SCOPE   POCT URINALYSIS W/O  SCOPE   POCT CMP   POCT PROTIME-INR   POCT TROPONIN   POCT B-TYPE NATRIURETIC PEPTIDE (BNP)   POCT LIVER PANEL   ISTAT PROCEDURE   POCT LIVER PANEL   POCT B-TYPE NATRIURETIC PEPTIDE (BNP)   POCT TROPONIN       EKG Readings: (Independently Interpreted)   No STEMI. Rate of 75. Normal Sinus Rhythm. Normal Axis. Abnormal EKG. LVH appreciated. QTc normal at 422. When compared to prior EKG dated 8/9/04 rate increased by 23 bpm.      Imaging Results              CT Abdomen Pelvis With Contrast (Final result)  Result time 11/08/22 19:23:44      Final result by Pete Marrufo MD (11/08/22 19:23:44)                   Impression:      1. Slow flow through scattered nondilated fluid-filled small bowel loops, finding is nonspecific however early enteritis can present in this fashion.  Correlation is advised.  2. Findings suggesting hepatic steatosis, correlation with LFTs recommended.  3. Colonic diverticulosis without findings to suggest diverticulitis.  4. Please see above for additional findings.      Electronically signed by: Pete Marrufo MD  Date:    11/08/2022  Time:    19:23               Narrative:    EXAMINATION:  CT ABDOMEN PELVIS WITH CONTRAST    CLINICAL HISTORY:  LLQ abdominal pain;    TECHNIQUE:  Low dose axial images, sagittal and coronal reformations were obtained from the lung bases to the pubic symphysis following the IV administration of 100 mL of Omnipaque 350 .  Oral contrast was not given.    COMPARISON:  CT 01/13/2007    FINDINGS:  Images of the lower thorax are remarkable for bilateral dependent atelectasis, minimal.    There is a small hiatal hernia.  The liver is slightly hypoattenuating, possibly reflecting steatosis, correlation with LFTs recommended.  The spleen, pancreas, gallbladder and left adrenal gland are unremarkable.  There is a 1.4 cm right adrenal nodule nonspecific.  The portal vein, splenic vein, SMV, celiac axis and SMA all are patent.  No significant abdominal  lymphadenopathy.    The kidneys without hydronephrosis or nephrolithiasis.  The bilateral ureters are unremarkable without calculi seen.  The urinary bladder is nondistended.  The prostate is not enlarged.    There are several scattered colonic diverticula without inflammation.  There is moderate stool in the colon.  The terminal ileum and appendix are unremarkable.  The small bowel is grossly unremarkable noting slow flow through a few fluid-filled distal small bowel loops, nondilated.  No focal organized pelvic fluid collection.  There are a few scattered shotty periaortic and paracaval lymph nodes.    No acute osseous abnormality.  No significant inguinal lymphadenopathy.                                       X-Ray Chest PA And Lateral (Final result)  Result time 11/08/22 17:49:44      Final result by Bryce Johnson MD (11/08/22 17:49:44)                   Impression:      No acute process.      Electronically signed by: Bryce Johnson MD  Date:    11/08/2022  Time:    17:49               Narrative:    EXAMINATION:  XR CHEST PA AND LATERAL    CLINICAL HISTORY:  Chest Pain;    TECHNIQUE:  PA and lateral views of the chest were performed.    COMPARISON:  None    FINDINGS:  The trachea is unremarkable.  The cardiomediastinal silhouette is within normal limits.  The hemidiaphragms are unremarkable.  There is no evidence of free air beneath the hemidiaphragms.  There are no pleural effusions.  There is no evidence of a pneumothorax.  There is no evidence of pneumomediastinum.  No airspace opacity is present.  The osseous structures are unremarkable.                                       Medications   ondansetron disintegrating tablet 4 mg (4 mg Oral Given 11/8/22 1748)   famotidine tablet 20 mg (20 mg Oral Given 11/8/22 1748)   dicyclomine capsule 20 mg (20 mg Oral Given 11/8/22 1748)   aspirin tablet 325 mg (325 mg Oral Given 11/8/22 1748)   sodium chloride 0.9% bolus 1,000 mL (0 mLs Intravenous Stopped 11/8/22 2104)    iohexoL (OMNIPAQUE 350) injection 100 mL (100 mLs Intravenous Given 11/8/22 1908)   nitroGLYCERIN 2% TD oint ointment 1 inch (1 inch Topical (Top) Given 11/8/22 1917)   amLODIPine tablet 10 mg (10 mg Oral Given 11/8/22 1917)     Medical Decision Making:   History:   Old Medical Records: I decided to obtain old medical records.  Independently Interpreted Test(s):   I have ordered and independently interpreted EKG Reading(s) - see prior notes  Clinical Tests:   Lab Tests: Ordered and Reviewed  Radiological Study: Ordered and Reviewed  Medical Tests: Ordered and Reviewed  ED Management:  I assumed care of this patient at 7:30 p.m..  Patient is pending repeat troponin.  Patient is felt to have his presentation to today secondary to poorly controlled hypertension.  Additionally the patient also has some GI upset that is related to gastroesophageal reflux disease.  Patient responded nicely to lowering his blood pressure with amlodipine.  Patient's 1st and 2nd troponin were unremarkable EKGs nondiagnostic and normal.  Patient will be discharged home with blood pressure control instructions to follow up his primary health care provider.     Chief complaint: Abdominal pain  Differential diagnosis: STEMI, NSTEMI, Cardiac Arrhythmia, Hypertension, Uncontrolled hypertension, Gastric, Gastroenteritis, UTI, Diverticulitis     Treatment in the ED: PE, Aspirin, IVF, Dicyclomine, Zofran, Famotidine   Patient reports feeling better after treatment in the ER.      Turn care patient over to Dr. Harden.  We discussed patient's presentation, vital signs, ED course, treatment, pending re-evaluation, pending labs and pending radiology reports.      Fill and take prescriptions as directed.  Return to the ED if symptoms worsen or do not resolve.   Answered questions and discussed discharge plan.    Patient feels better and is ready for discharge.  Follow up with PCP/specialist in 1 day.     Scribe Attestation:   Scribe #1: I performed  the above scribed service and the documentation accurately describes the services I performed. I attest to the accuracy of the note.            Results for orders placed or performed during the hospital encounter of 11/08/22   Troponin ISTAT   Result Value Ref Range    POC Cardiac Troponin I 0.01 <0.09 ng/mL    Sample unknown    Troponin ISTAT   Result Value Ref Range    POC Cardiac Troponin I 0.00 <0.09 ng/mL    Sample unknown    POCT CBC   Result Value Ref Range    Hematocrit      Hemoglobin      RBC      WBC      MCV      MCH, POC      MCHC      RDW-CV      Platelet Count, POC      MPV     POCT URINALYSIS W/O SCOPE   Result Value Ref Range    Glucose, UA Negative     Bilirubin, UA Negative     Ketones, UA Negative     Spec Grav UA 1.025     Blood, UA Negative     PH, UA 6.5     Protein, UA Negative     Urobilinogen, UA 0.2 E.U./dL    Nitrite, UA Negative     Leukocytes, UA Negative     Color, UA Yellow     Clarity, UA Clear    ISTAT PROCEDURE   Result Value Ref Range    POC PTWBT 12.9 9.7 - 14.3 sec    POC PTINR 1.1 0.9 - 1.2    Sample unknown    POCT Liver Panel   Result Value Ref Range    Albumin, POC 4.4 3.3 - 5.5 g/dL    Alkaline Phosphatase,  42 - 141 U/L    ALT (SGPT), POC 20 10 - 47 U/L    Amylase, POC 48 14 - 97 U/L    AST (SGOT), POC 32 11 - 38 U/L    POC GGT 20 5 - 65 U/L    Bilirubin, POC 0.5 0.2 - 1.6 mg/dL    Protein, POC 8.0 6.4 - 8.1 g/dL   POCT CMP   Result Value Ref Range    Albumin, POC 4.3 3.3 - 5.5 g/dL    Alkaline Phosphatase, POC 96 42 - 141 U/L    ALT (SGPT), POC 25 10 - 47 U/L    AST (SGOT), POC 33 11 - 38 U/L    POC BUN 12 7 - 22 mg/dL    Calcium, POC 9.9 8.0 - 10.3 mg/dL    POC Chloride 102 98 - 108 mmol/L    POC Creatinine 1.2 0.6 - 1.2 mg/dL    POC Glucose 91 73 - 118 mg/dL    POC Potassium 3.6 3.6 - 5.1 mmol/L    POC Sodium 150 (H) 128 - 145 mmol/L    Bilirubin, POC 0.5 0.2 - 1.6 mg/dL    POC TCO2 29 18 - 33 mmol/L    Protein, POC 8.1 6.4 - 8.1 g/dL   POCT B-type natriuretic  peptide (BNP)   Result Value Ref Range    POC B-Type Natriuretic Peptide 10.3 0.0 - 100.0 pg/mL     Imaging Results              CT Abdomen Pelvis With Contrast (Final result)  Result time 11/08/22 19:23:44      Final result by Pete Marrufo MD (11/08/22 19:23:44)                   Impression:      1. Slow flow through scattered nondilated fluid-filled small bowel loops, finding is nonspecific however early enteritis can present in this fashion.  Correlation is advised.  2. Findings suggesting hepatic steatosis, correlation with LFTs recommended.  3. Colonic diverticulosis without findings to suggest diverticulitis.  4. Please see above for additional findings.      Electronically signed by: Pete Marrufo MD  Date:    11/08/2022  Time:    19:23               Narrative:    EXAMINATION:  CT ABDOMEN PELVIS WITH CONTRAST    CLINICAL HISTORY:  LLQ abdominal pain;    TECHNIQUE:  Low dose axial images, sagittal and coronal reformations were obtained from the lung bases to the pubic symphysis following the IV administration of 100 mL of Omnipaque 350 .  Oral contrast was not given.    COMPARISON:  CT 01/13/2007    FINDINGS:  Images of the lower thorax are remarkable for bilateral dependent atelectasis, minimal.    There is a small hiatal hernia.  The liver is slightly hypoattenuating, possibly reflecting steatosis, correlation with LFTs recommended.  The spleen, pancreas, gallbladder and left adrenal gland are unremarkable.  There is a 1.4 cm right adrenal nodule nonspecific.  The portal vein, splenic vein, SMV, celiac axis and SMA all are patent.  No significant abdominal lymphadenopathy.    The kidneys without hydronephrosis or nephrolithiasis.  The bilateral ureters are unremarkable without calculi seen.  The urinary bladder is nondistended.  The prostate is not enlarged.    There are several scattered colonic diverticula without inflammation.  There is moderate stool in the colon.  The terminal ileum and appendix  are unremarkable.  The small bowel is grossly unremarkable noting slow flow through a few fluid-filled distal small bowel loops, nondilated.  No focal organized pelvic fluid collection.  There are a few scattered shotty periaortic and paracaval lymph nodes.    No acute osseous abnormality.  No significant inguinal lymphadenopathy.                                       X-Ray Chest PA And Lateral (Final result)  Result time 11/08/22 17:49:44      Final result by Bryce Johnson MD (11/08/22 17:49:44)                   Impression:      No acute process.      Electronically signed by: Bryce Johnson MD  Date:    11/08/2022  Time:    17:49               Narrative:    EXAMINATION:  XR CHEST PA AND LATERAL    CLINICAL HISTORY:  Chest Pain;    TECHNIQUE:  PA and lateral views of the chest were performed.    COMPARISON:  None    FINDINGS:  The trachea is unremarkable.  The cardiomediastinal silhouette is within normal limits.  The hemidiaphragms are unremarkable.  There is no evidence of free air beneath the hemidiaphragms.  There are no pleural effusions.  There is no evidence of a pneumothorax.  There is no evidence of pneumomediastinum.  No airspace opacity is present.  The osseous structures are unremarkable.                                          I, Dr. Kirsten Samuel, personally performed the services described in this documentation. This document was produced by a scribe under my direction and in my presence. All medical record entries made by the scribe were at my direction and in my presence.  I have reviewed the chart and agree that the record reflects my personal performance and is accurate and complete. Kirsten Samuel, .     11/09/2022 7:38 PM    Clinical Impression:   Final diagnoses:  [R07.9] Chest pain  [R10.9] Abdominal pain, unspecified abdominal location (Primary)  [K52.9] Gastroenteritis  [I16.0] Hypertensive urgency        ED Disposition Condition    Discharge Stable          ED Prescriptions        Medication Sig Dispense Start Date End Date Auth. Provider    amLODIPine (NORVASC) 10 MG tablet Take 1 tablet (10 mg total) by mouth once daily. 30 tablet 11/8/2022 11/8/2023 Kirsten Samuel, DO    ondansetron (ZOFRAN-ODT) 8 MG TbDL Take 1 tablet (8 mg total) by mouth every 6 (six) hours as needed (As needed for nausea). 20 tablet 11/8/2022 11/10/2022 Kirsten Samuel, DO    famotidine (PEPCID) 20 MG tablet Take 1 tablet (20 mg total) by mouth 2 (two) times daily. 20 tablet 11/8/2022 11/8/2023 Kirsten Samuel, DO    ibuprofen (ADVIL,MOTRIN) 600 MG tablet Take 1 tablet (600 mg total) by mouth every 6 (six) hours as needed for Pain (Take with food as needed for mild-to-moderate pain). 20 tablet 11/8/2022 -- Kirsten Samuel DO    acetaminophen (TYLENOL) 500 MG tablet Take 2 tablets (1,000 mg total) by mouth every 6 (six) hours as needed for Pain. 30 tablet 11/8/2022 -- Kirsten Samuel, DO    aspirin 81 MG Chew Take 1 tablet (81 mg total) by mouth once daily. 30 tablet 11/8/2022 11/8/2023 Kirsten Samuel, DO          Follow-up Information       Follow up With Specialties Details Why Contact Info    Your PCP  Schedule an appointment as soon as possible for a visit in 1 week               Mitch Harden MD  11/09/22 0649

## 2023-09-01 ENCOUNTER — HOSPITAL ENCOUNTER (EMERGENCY)
Facility: HOSPITAL | Age: 38
Discharge: HOME | End: 2023-09-01
Attending: EMERGENCY MEDICINE
Payer: MEDICAID

## 2023-09-01 VITALS
WEIGHT: 160 LBS | HEIGHT: 67 IN | SYSTOLIC BLOOD PRESSURE: 177 MMHG | TEMPERATURE: 99 F | RESPIRATION RATE: 20 BRPM | DIASTOLIC BLOOD PRESSURE: 110 MMHG | BODY MASS INDEX: 25.11 KG/M2 | HEART RATE: 76 BPM | OXYGEN SATURATION: 100 %

## 2023-09-01 DIAGNOSIS — M79.641 HAND PAIN, RIGHT: ICD-10-CM

## 2023-09-01 DIAGNOSIS — M79.2 NERVE PAIN: Primary | ICD-10-CM

## 2023-09-01 LAB
ALBUMIN SERPL BCP-MCNC: 4.3 G/DL (ref 3.5–5.2)
ALP SERPL-CCNC: 98 U/L (ref 55–135)
ALT SERPL W/O P-5'-P-CCNC: 14 U/L (ref 10–44)
ANION GAP SERPL CALC-SCNC: 11 MMOL/L (ref 8–16)
AST SERPL-CCNC: 16 U/L (ref 10–40)
BACTERIA #/AREA URNS HPF: NORMAL /HPF
BASOPHILS # BLD AUTO: 0.06 K/UL (ref 0–0.2)
BASOPHILS NFR BLD: 0.6 % (ref 0–1.9)
BILIRUB SERPL-MCNC: 0.4 MG/DL (ref 0.1–1)
BILIRUB UR QL STRIP: NEGATIVE
BUN SERPL-MCNC: 9 MG/DL (ref 6–20)
CALCIUM SERPL-MCNC: 9.6 MG/DL (ref 8.7–10.5)
CHLORIDE SERPL-SCNC: 104 MMOL/L (ref 95–110)
CLARITY UR: CLEAR
CO2 SERPL-SCNC: 28 MMOL/L (ref 23–29)
COLOR UR: YELLOW
CREAT SERPL-MCNC: 1.2 MG/DL (ref 0.5–1.4)
DIFFERENTIAL METHOD: ABNORMAL
EOSINOPHIL # BLD AUTO: 0.1 K/UL (ref 0–0.5)
EOSINOPHIL NFR BLD: 0.6 % (ref 0–8)
ERYTHROCYTE [DISTWIDTH] IN BLOOD BY AUTOMATED COUNT: 12.8 % (ref 11.5–14.5)
EST. GFR  (NO RACE VARIABLE): >60 ML/MIN/1.73 M^2
GLUCOSE SERPL-MCNC: 145 MG/DL (ref 70–110)
GLUCOSE UR QL STRIP: ABNORMAL
HCT VFR BLD AUTO: 44.9 % (ref 40–54)
HGB BLD-MCNC: 14.7 G/DL (ref 14–18)
HGB UR QL STRIP: NEGATIVE
HYALINE CASTS #/AREA URNS LPF: 1 /LPF
IMM GRANULOCYTES # BLD AUTO: 0.03 K/UL (ref 0–0.04)
IMM GRANULOCYTES NFR BLD AUTO: 0.3 % (ref 0–0.5)
KETONES UR QL STRIP: NEGATIVE
LEUKOCYTE ESTERASE UR QL STRIP: NEGATIVE
LYMPHOCYTES # BLD AUTO: 2.1 K/UL (ref 1–4.8)
LYMPHOCYTES NFR BLD: 21.7 % (ref 18–48)
MCH RBC QN AUTO: 29.8 PG (ref 27–31)
MCHC RBC AUTO-ENTMCNC: 32.7 G/DL (ref 32–36)
MCV RBC AUTO: 91 FL (ref 82–98)
MICROSCOPIC COMMENT: NORMAL
MONOCYTES # BLD AUTO: 0.5 K/UL (ref 0.3–1)
MONOCYTES NFR BLD: 5 % (ref 4–15)
NEUTROPHILS # BLD AUTO: 7 K/UL (ref 1.8–7.7)
NEUTROPHILS NFR BLD: 71.8 % (ref 38–73)
NITRITE UR QL STRIP: NEGATIVE
NRBC BLD-RTO: 0 /100 WBC
PH UR STRIP: 6 [PH] (ref 5–8)
PLATELET # BLD AUTO: 295 K/UL (ref 150–450)
PMV BLD AUTO: 8.6 FL (ref 9.2–12.9)
POCT GLUCOSE: 131 MG/DL (ref 70–110)
POTASSIUM SERPL-SCNC: 3.2 MMOL/L (ref 3.5–5.1)
PROT SERPL-MCNC: 8.1 G/DL (ref 6–8.4)
PROT UR QL STRIP: ABNORMAL
RBC # BLD AUTO: 4.93 M/UL (ref 4.6–6.2)
RBC #/AREA URNS HPF: 0 /HPF (ref 0–4)
SODIUM SERPL-SCNC: 143 MMOL/L (ref 136–145)
SP GR UR STRIP: 1.03 (ref 1–1.03)
URN SPEC COLLECT METH UR: ABNORMAL
UROBILINOGEN UR STRIP-ACNC: ABNORMAL EU/DL
WBC # BLD AUTO: 9.74 K/UL (ref 3.9–12.7)
WBC #/AREA URNS HPF: 2 /HPF (ref 0–5)

## 2023-09-01 PROCEDURE — 96372 THER/PROPH/DIAG INJ SC/IM: CPT

## 2023-09-01 PROCEDURE — 81000 URINALYSIS NONAUTO W/SCOPE: CPT | Performed by: EMERGENCY MEDICINE

## 2023-09-01 PROCEDURE — 85025 COMPLETE CBC W/AUTO DIFF WBC: CPT | Performed by: EMERGENCY MEDICINE

## 2023-09-01 PROCEDURE — 99284 EMERGENCY DEPT VISIT MOD MDM: CPT

## 2023-09-01 PROCEDURE — 63600175 PHARM REV CODE 636 W HCPCS

## 2023-09-01 PROCEDURE — 82962 GLUCOSE BLOOD TEST: CPT

## 2023-09-01 PROCEDURE — 81003 URINALYSIS AUTO W/O SCOPE: CPT | Performed by: EMERGENCY MEDICINE

## 2023-09-01 PROCEDURE — 80053 COMPREHEN METABOLIC PANEL: CPT | Performed by: EMERGENCY MEDICINE

## 2023-09-01 RX ORDER — KETOROLAC TROMETHAMINE 30 MG/ML
30 INJECTION, SOLUTION INTRAMUSCULAR; INTRAVENOUS
Status: COMPLETED | OUTPATIENT
Start: 2023-09-01 | End: 2023-09-01

## 2023-09-01 RX ORDER — GABAPENTIN 300 MG/1
300 CAPSULE ORAL 3 TIMES DAILY
Qty: 42 CAPSULE | Refills: 0 | Status: SHIPPED | OUTPATIENT
Start: 2023-09-01 | End: 2023-09-15

## 2023-09-01 RX ADMIN — KETOROLAC TROMETHAMINE 30 MG: 30 INJECTION, SOLUTION INTRAMUSCULAR; INTRAVENOUS at 05:09

## 2023-09-01 NOTE — ED PROVIDER NOTES
Encounter Date: 9/1/2023       History     Chief Complaint   Patient presents with    Hand Pain     Pt reports hand pain with 'pin and needles' numbness x 2 days. Pt did not take any medications.  +sensation, movement and pulses. Pt did not take any medications for pain     38-year-old male presents with right arm pain, tingling, numbness x3 weeks.  Patient states this intermittently occurs with no known triggers.  He does state that he broke his hand 2 years ago and was casted with full recovery.  He denies any known injury.  Has no history of clotting disorders.  He states he has not tried any over-the-counter medications.  He states he does not use his hands a lot at work    The history is provided by the patient. No  was used.     Review of patient's allergies indicates:  No Known Allergies  Past Medical History:   Diagnosis Date    Back pain     x 10 years    HLD (hyperlipidemia)     Hypertension      History reviewed. No pertinent surgical history.  History reviewed. No pertinent family history.  Social History     Tobacco Use    Smoking status: Never    Smokeless tobacco: Never    Tobacco comments:     black and milds   Substance Use Topics    Alcohol use: Yes     Comment: occasionally    Drug use: No     Review of Systems   Constitutional:  Negative for chills and fever.   HENT:  Negative for congestion, ear pain, rhinorrhea and sore throat.    Eyes:  Negative for redness.   Respiratory:  Negative for shortness of breath and stridor.    Cardiovascular:  Negative for chest pain.   Gastrointestinal:  Negative for abdominal pain, constipation, diarrhea, nausea and vomiting.   Genitourinary:  Negative for dysuria, frequency, hematuria and urgency.   Musculoskeletal:  Positive for arthralgias. Negative for back pain, gait problem, joint swelling and neck pain.   Skin:  Negative for color change and rash.   Neurological:  Positive for numbness. Negative for dizziness, speech difficulty,  weakness and light-headedness.   Hematological:  Does not bruise/bleed easily.   Psychiatric/Behavioral:  Negative for confusion.        Physical Exam     Initial Vitals [09/01/23 1538]   BP Pulse Resp Temp SpO2   (S) (!) 192/111 76 20 99.1 °F (37.3 °C) 100 %      MAP       --         Physical Exam    Nursing note and vitals reviewed.  Constitutional: He appears well-developed and well-nourished.  Non-toxic appearance. He does not have a sickly appearance. No distress.   HENT:   Head: Normocephalic.   Right Ear: External ear normal.   Left Ear: External ear normal.   Mouth/Throat: No trismus in the jaw.   Eyes: EOM are normal. Pupils are equal, round, and reactive to light. Right eye exhibits no discharge. Left eye exhibits no discharge. No scleral icterus.   Neck: No tracheal deviation present.   Normal range of motion.  Cardiovascular:  Normal rate and regular rhythm.           Pulmonary/Chest: Breath sounds normal. No tachypnea and no bradypnea. No respiratory distress.   Abdominal: He exhibits no distension. There is no abdominal tenderness.   Musculoskeletal:         General: No tenderness or edema. Normal range of motion.      Cervical back: Normal range of motion.      Comments: Palpation does not increase symptoms.  External rotation of shoulder notes increase in tingling and numbness sensation.  2+ pulses.  No discoloration, erythema to arm or hand.  Full sensation in arm and fingers with 5/5 strength     Neurological: He is alert and oriented to person, place, and time. He has normal strength. No cranial nerve deficit. GCS eye subscore is 4. GCS verbal subscore is 5. GCS motor subscore is 6.   Skin: Skin is warm and dry. No rash noted.   Psychiatric: He has a normal mood and affect. His behavior is normal. Judgment and thought content normal.         ED Course   Procedures  Labs Reviewed   COMPREHENSIVE METABOLIC PANEL - Abnormal; Notable for the following components:       Result Value    Potassium 3.2  (*)     Glucose 145 (*)     All other components within normal limits   CBC W/ AUTO DIFFERENTIAL - Abnormal; Notable for the following components:    MPV 8.6 (*)     All other components within normal limits   URINALYSIS - Abnormal; Notable for the following components:    Protein, UA 1+ (*)     Glucose, UA Trace (*)     Urobilinogen, UA 2.0-3.0 (*)     All other components within normal limits   POCT GLUCOSE - Abnormal; Notable for the following components:    POCT Glucose 131 (*)     All other components within normal limits   URINALYSIS MICROSCOPIC   POCT GLUCOSE MONITORING CONTINUOUS          Imaging Results              X-Ray Hand 2 View Right (Final result)  Result time 09/01/23 17:07:59      Final result by Lance Arndt MD (09/01/23 17:07:59)                   Impression:      No acute fracture dislocation.  Additional findings above.      Electronically signed by: Lance Arndt MD  Date:    09/01/2023  Time:    17:07               Narrative:    EXAMINATION:  XR HAND 2 VIEW RIGHT    CLINICAL HISTORY:  Pain in right hand    TECHNIQUE:  Two views right hand    COMPARISON:  [facility] thousand [facility]one    FINDINGS:  Interval healing of 3rd metacarpal shaft fracture deformity.  Mild DJD 1st meta carpal carpal, radial ulnar joints.                                    X-Rays:   Independently Interpreted Readings:   Other Readings:  X-ray without any bony abnormalities    Medications   ketorolac injection 30 mg (30 mg Intramuscular Given 9/1/23 8712)     Medical Decision Making  38-year-old male presents with right arm pain, tingling, numbness x3 weeks.  Patient states this intermittently occurs with no known triggers.  He does state that he broke his hand 2 years ago and was casted with full recovery.  He denies any known injury.  Has no history of clotting disorders.  He states he has not tried any over-the-counter medications.   Palpation does not increase symptoms.  External rotation of shoulder notes  increase in tingling and numbness sensation.  2+ pulses.  No discoloration, erythema to arm or hand.  Full sensation in arm and fingers with 5/5 strength. Increase numbness and tingling with external rotation of shoulder  Differentials include fracture, arthritis, DVT, nerve compression.  X-ray hand no acute fracture, dislocation or bony abnormality.  CBC unremarkable with no white counts.  No electrolyte abnormalities.  Advised patient to increase potassium intake.  UA with indication of dehydration.  Emphasized importance of fluid intake. Glucose within normal limits.  Arthritis unlikely due to age of patient, medical history and no increased risk factors.  DVT unlikely due to physical exam.  No edema, erythema or temperature abnormalities.  Nerve compression likely due to ongoing, intermittent numbness and tingling radiating from shoulder down right arm with no known provoking factors.  Given IM Toradol and discharged with gabapentin with instructions to follow up with primary care.  Patient states he has no appointment scheduled in 4 days from now and will re-evaluate symptoms then.    The patient's blood pressure is elevated here in the emergency department.  The patient has a history of hypertension and and this is likely long-standing uncontrolled hypertension. States he just took his blood pressure medicine prior to arrival.  Blood pressure trending down appropriately. Based on the patient's presentation today I do not see any signs of endorgan damage representing hypertensive urgency or emergency.  I do not think the patient's presentation necessitates further intervention in the Emergency Department to acutely decrease their blood pressure.  I have given the patient and/or their family specific return precautions and instructions to follow up with their regular doctor.      Please take all medications as directed. All medications may potentially have side-effects and it is impossible to predict which  medications may give you side-effects or what side-effects (if any) they will give you.. If you feel that you are having a negative effect or side-effect of any medication you should immediately stop taking them and seek medical attention. If you feel that you are having a life-threatening reaction call 911.  Do not drive, swim, climb to height, take a bath, operate heavy machinery, drink alcohol or take potentially sedating medications, sign any legal documents or make any important decisions for 24 hours if you have received any pain medications, sedatives or mood altering drugs during your ER visit or within 24 hours of taking them if they have been prescribed to you.      I have discussed this patient with Dr. Vilchis and he/she agreeds with my diagnostic and treatment plan.     Amount and/or Complexity of Data Reviewed  Independent Historian:      Details: Patient independent historian  External Data Reviewed: labs, radiology and notes.     Details: External data reviewed  Labs: ordered. Decision-making details documented in ED Course.  Radiology: ordered. Decision-making details documented in ED Course.    Risk  OTC drugs.  Prescription drug management.  Diagnosis or treatment significantly limited by social determinants of health.               ED Course as of 09/01/23 1935   Fri Sep 01, 2023   1605 BP(!): 177/110  Bp trending down [CC]   1621 POCT Glucose(!): 131 [CC]   1621 CBC Auto Differential(!)  No increased white count or decreased H&H [CC]   1651 Urinalysis(!)  UA consistent with dehydration [CC]   1651 Potassium(!): 3.2 [CC]   1651 Potassium slightly decreased.  Will advise patient to increase potassium intake and follow up with primary care [CC]      ED Course User Index  [CC] Mahnaz Ivory PA-C                    Clinical Impression:   Final diagnoses:  [M79.641] Hand pain, right  [M79.2] Nerve pain (Primary)        ED Disposition Condition    Discharge Stable          ED Prescriptions        Medication Sig Dispense Start Date End Date Auth. Provider    gabapentin (NEURONTIN) 300 MG capsule Take 1 capsule (300 mg total) by mouth 3 (three) times daily. for 14 days 42 capsule 9/1/2023 9/15/2023 Mahnaz Ivory PA-C          Follow-up Information       Follow up With Specialties Details Why Contact Info    [facility] Emergency [facility] Go to  For new or worsening symptoms [address] [employer] [id number]  [phone]    Minna Gabriel CRNP Internal Medicine  Continue to appointment scheduled for 9/5 [address] [facility] [address]  [phone]               Mahnaz Ivory PA-C  09/01/23 1937

## 2023-09-01 NOTE — FIRST PROVIDER EVALUATION
"Medical screening examination initiated.  I have conducted a focused provider triage encounter, findings are as follows:    Brief history of present illness:  This is a 38-year-old gentleman with a history of hypertension who presents with a complaint of pain in the dorsum of his right hand.  He is right-hand dominant.  He reports that the pain is a sensation of pins and needles over an area of the middle of his right hand that he has injured a few years ago.  He reports he has had similar episodes of pain intermittently over the last few years but he has been having these symptoms now for the last 2 days.  He has tried no over-the-counter medications.  He has not re-injured the hand.    Vitals:    09/01/23 1538   BP: (S) (!) 192/111  Comment: pt took bp med 1530   BP Location: Left arm   Patient Position: Sitting   Pulse: 76   Resp: 20   Temp: 99.1 °F (37.3 °C)   TempSrc: Oral   SpO2: 100%   Weight: 72.6 kg (160 lb)   Height: 5' 7" (1.702 m)       Pertinent physical exam:  Alert oriented.  Regular rate and rhythm.  Unlabored respirations.  Patient points to a elliptical area over the dorsum of the right hand along the 3rd metacarpal as the area of pain.    I incidentally the patient's blood pressure is found to be elevated he reports he took his blood pressure medicine today just prior to arrival.  I will order baseline labs to rule out end-organ damage     Brief workup plan:  I have ordered imaging and point care glucose.    Preliminary workup initiated; this workup will be continued and followed by the physician or advanced practice provider that is assigned to the patient when roomed.  "

## 2023-09-01 NOTE — DISCHARGE INSTRUCTIONS
Thank you for coming to our Emergency Department today. It is important to remember that some problems or medical conditions are difficult to diagnose and may not be found or addressed during your Emergency Department visit.  These conditions often start with non-specific symptoms and can only be diagnosed on follow up visits with your primary care physician or specialist when the symptoms continue or change. Please remember that all medical conditions can change, and we cannot predict how you will be feeling tomorrow or the next day. Return to the ER with any questions/concerns, new/concerning symptoms, worsening or failure to improve.       Be sure to follow up with your primary care doctor and review all labs/imaging/tests that were performed during your ER visit with them. It is very common for us to identify non-emergent incidental findings which must be followed up with your primary care physician.  Some labs/imaging/tests may be outside of the normal range, and require non-emergent follow-up and/or further investigation/treatment/procedures/testing to help diagnose/exclude/prevent complications or other potentially serious medical conditions. Some abnormalities may not have been discussed or addressed during your ER visit.     An ER visit does not replace a primary care visit, and many screening tests or follow-up tests cannot be ordered by an ER doctor or performed by the ER. Some tests may even require pre-approval.    If you do not have a primary care doctor, you may contact the one listed on your discharge paperwork or you may also call the [facility] Appointment Desk at [phone] , or [address] [place] [address] at  [phone] to schedule an appointment, or establish care with a primary care doctor or even a specialist and to obtain information about local resources. It is important to your health that you have a primary care doctor.    Please take all medications as directed. We have done our best to select  a medication for you that will treat your condition however, all medications may potentially have side-effects and it is impossible to predict which medications may give you side-effects or what those side-effects (if any) those medications may give you.  If you feel that you are having a negative effect or side-effect of any medication you should stop taking those medications immediately and seek medical attention. If you feel that you are having a life-threatening reaction call 911.        Do not drive, swim, climb to height, take a bath, operate heavy machinery, drink alcohol or take potentially sedating medications, sign any legal documents or make any important decisions for 24 hours if you have received any pain medications, sedatives or mood altering drugs during your ER visit or within 24 hours of taking them if they have been prescribed to you.     You can find additional resources for Dentists, hearing aids, durable medical equipment, low cost pharmacies and other resources at [email or web address]